# Patient Record
Sex: FEMALE | Race: WHITE
[De-identification: names, ages, dates, MRNs, and addresses within clinical notes are randomized per-mention and may not be internally consistent; named-entity substitution may affect disease eponyms.]

---

## 2017-01-17 ENCOUNTER — RX ONLY (OUTPATIENT)
Age: 22
Setting detail: RX ONLY
End: 2017-01-17

## 2017-01-23 ENCOUNTER — RX ONLY (OUTPATIENT)
Age: 22
Setting detail: RX ONLY
End: 2017-01-23

## 2018-08-27 ENCOUNTER — APPOINTMENT (RX ONLY)
Dept: URBAN - METROPOLITAN AREA OTHER 8 | Facility: OTHER | Age: 23
Setting detail: DERMATOLOGY
End: 2018-08-27

## 2018-08-27 DIAGNOSIS — T07XXXA INSECT BITE, NONVENOMOUS, OF OTHER, MULTIPLE, AND UNSPECIFIED SITES, WITHOUT MENTION OF INFECTION: ICD-10-CM

## 2018-08-27 DIAGNOSIS — L85.3 XEROSIS CUTIS: ICD-10-CM

## 2018-08-27 DIAGNOSIS — L72.8 OTHER FOLLICULAR CYSTS OF THE SKIN AND SUBCUTANEOUS TISSUE: ICD-10-CM

## 2018-08-27 PROBLEM — S80.861A INSECT BITE (NONVENOMOUS), RIGHT LOWER LEG, INITIAL ENCOUNTER: Status: ACTIVE | Noted: 2018-08-27

## 2018-08-27 PROBLEM — E03.9 HYPOTHYROIDISM, UNSPECIFIED: Status: ACTIVE | Noted: 2018-08-27

## 2018-08-27 PROBLEM — S80.862A INSECT BITE (NONVENOMOUS), LEFT LOWER LEG, INITIAL ENCOUNTER: Status: ACTIVE | Noted: 2018-08-27

## 2018-08-27 PROBLEM — L20.84 INTRINSIC (ALLERGIC) ECZEMA: Status: ACTIVE | Noted: 2018-08-27

## 2018-08-27 PROCEDURE — ? COUNSELING

## 2018-08-27 PROCEDURE — 99213 OFFICE O/P EST LOW 20 MIN: CPT

## 2018-08-27 ASSESSMENT — LOCATION SIMPLE DESCRIPTION DERM
LOCATION SIMPLE: POSTERIOR NECK
LOCATION SIMPLE: LEFT FOREHEAD
LOCATION SIMPLE: LEFT FOREARM
LOCATION SIMPLE: RIGHT PRETIBIAL REGION
LOCATION SIMPLE: LEFT FOREHEAD
LOCATION SIMPLE: RIGHT PRETIBIAL REGION
LOCATION SIMPLE: LEFT PRETIBIAL REGION

## 2018-08-27 ASSESSMENT — LOCATION DETAILED DESCRIPTION DERM
LOCATION DETAILED: RIGHT PROXIMAL PRETIBIAL REGION
LOCATION DETAILED: LEFT VENTRAL LATERAL PROXIMAL FOREARM
LOCATION DETAILED: RIGHT INFERIOR POSTERIOR NECK
LOCATION DETAILED: LEFT INFERIOR MEDIAL FOREHEAD
LOCATION DETAILED: LEFT LATERAL PROXIMAL PRETIBIAL REGION
LOCATION DETAILED: RIGHT PROXIMAL PRETIBIAL REGION
LOCATION DETAILED: LEFT INFERIOR MEDIAL FOREHEAD

## 2018-08-27 ASSESSMENT — LOCATION ZONE DERM
LOCATION ZONE: LEG
LOCATION ZONE: FACE
LOCATION ZONE: FACE
LOCATION ZONE: ARM
LOCATION ZONE: NECK
LOCATION ZONE: LEG

## 2018-11-30 ENCOUNTER — APPOINTMENT (RX ONLY)
Dept: URBAN - METROPOLITAN AREA OTHER 8 | Facility: OTHER | Age: 23
Setting detail: DERMATOLOGY
End: 2018-11-30

## 2018-11-30 ENCOUNTER — APPOINTMENT (RX ONLY)
Dept: URBAN - METROPOLITAN AREA OTHER 9 | Facility: OTHER | Age: 23
Setting detail: DERMATOLOGY
End: 2018-11-30

## 2018-11-30 DIAGNOSIS — L30.0 NUMMULAR DERMATITIS: ICD-10-CM

## 2018-11-30 DIAGNOSIS — B07.8 OTHER VIRAL WARTS: ICD-10-CM

## 2018-11-30 PROBLEM — D23.5 OTHER BENIGN NEOPLASM OF SKIN OF TRUNK: Status: ACTIVE | Noted: 2018-11-30

## 2018-11-30 PROBLEM — D23.61 OTHER BENIGN NEOPLASM OF SKIN OF RIGHT UPPER LIMB, INCLUDING SHOULDER: Status: ACTIVE | Noted: 2018-11-30

## 2018-11-30 PROCEDURE — 11900 INJECT SKIN LESIONS </W 7: CPT

## 2018-11-30 PROCEDURE — ? PRESCRIPTION

## 2018-11-30 PROCEDURE — ? INTRALESIONAL KENALOG

## 2018-11-30 PROCEDURE — 99213 OFFICE O/P EST LOW 20 MIN: CPT | Mod: 25

## 2018-11-30 PROCEDURE — ? COUNSELING

## 2018-11-30 RX ORDER — TRIAMCINOLONE ACETONIDE 1 MG/G
CREAM TOPICAL BID
Qty: 1 | Refills: 0 | Status: ERX | COMMUNITY
Start: 2018-11-30

## 2018-11-30 RX ORDER — SALICYLIC ACID 285 MG/ML
SOLUTION TOPICAL
Qty: 1 | Refills: 1 | Status: ERX | COMMUNITY
Start: 2018-11-30

## 2018-11-30 RX ORDER — CLINDAMYCIN PHOSPHATE 10 MG/ML
LOTION TOPICAL
Qty: 1 | Refills: 0 | Status: ERX | COMMUNITY
Start: 2018-11-30

## 2018-11-30 RX ADMIN — CLINDAMYCIN PHOSPHATE: 10 LOTION TOPICAL at 16:39

## 2018-11-30 RX ADMIN — TRIAMCINOLONE ACETONIDE: 1 CREAM TOPICAL at 16:50

## 2018-11-30 RX ADMIN — SALICYLIC ACID: 285 SOLUTION TOPICAL at 16:45

## 2018-11-30 NOTE — PROCEDURE: COUNSELING
Detail Level: Simple
Patient Specific Counseling (Will Not Stick From Patient To Patient): Pt will apply TMC bid when flaring. Gave pt sample of impoyz. She can take Zyrtec for itching
Patient Specific Counseling (Will Not Stick From Patient To Patient): Pt has been using otc and seems to be working. Sending in rx and she will do soaks and recheck 6 weeks

## 2018-11-30 NOTE — HPI: RASH
How Severe Is Your Rash?: moderate
Is This A New Presentation, Or A Follow-Up?: Rash
Additional History: Pt has applied otc wart removal, possible corn

## 2018-11-30 NOTE — PROCEDURE: INTRALESIONAL KENALOG
Medical Necessity Clause: This procedure was medically necessary because the lesions that were treated were:
Include Z78.9 (Other Specified Conditions Influencing Health Status) As An Associated Diagnosis?: No
Ndc# For Kenalog Only: 7963-3900-44
Concentration Of Kenalog Solution Injected (Mg/Ml): 5.0
Consent: The risks of atrophy were reviewed with the patient.
Administered By (Optional): Devorah
Total Volume (Ccs): .3
X Size Of Lesion In Cm (Optional): 0
Kenalog Preparation: Kenalog
Size Of Lesion (Optional): 0.4
Detail Level: Detailed

## 2018-12-03 ENCOUNTER — APPOINTMENT (RX ONLY)
Dept: URBAN - METROPOLITAN AREA OTHER 9 | Facility: OTHER | Age: 23
Setting detail: DERMATOLOGY
End: 2018-12-03

## 2018-12-14 ENCOUNTER — APPOINTMENT (RX ONLY)
Dept: URBAN - METROPOLITAN AREA OTHER 8 | Facility: OTHER | Age: 23
Setting detail: DERMATOLOGY
End: 2018-12-14

## 2018-12-14 DIAGNOSIS — L29.8 OTHER PRURITUS: ICD-10-CM

## 2018-12-14 DIAGNOSIS — L29.89 OTHER PRURITUS: ICD-10-CM

## 2018-12-14 DIAGNOSIS — D22 MELANOCYTIC NEVI: ICD-10-CM

## 2018-12-14 PROBLEM — D22.39 MELANOCYTIC NEVI OF OTHER PARTS OF FACE: Status: ACTIVE | Noted: 2018-12-14

## 2018-12-14 PROCEDURE — 99213 OFFICE O/P EST LOW 20 MIN: CPT

## 2018-12-14 PROCEDURE — ? OBSERVATION AND MEASURE

## 2018-12-14 PROCEDURE — ? COUNSELING

## 2018-12-14 ASSESSMENT — LOCATION DETAILED DESCRIPTION DERM
LOCATION DETAILED: LEFT INFERIOR CENTRAL MALAR CHEEK
LOCATION DETAILED: LEFT INFERIOR LATERAL MALAR CHEEK

## 2018-12-14 ASSESSMENT — LOCATION ZONE DERM: LOCATION ZONE: FACE

## 2018-12-14 ASSESSMENT — LOCATION SIMPLE DESCRIPTION DERM: LOCATION SIMPLE: LEFT CHEEK

## 2018-12-14 NOTE — PROCEDURE: OBSERVATION
Morphology Per Location (Optional): Light brown thin papule
Detail Level: Simple
Size Of Lesion: 0.3 cm

## 2018-12-14 NOTE — HPI: SKIN LESION
What Type Of Note Output Would You Prefer (Optional)?: Standard Output
How Severe Is Your Skin Lesion?: mild
Has Your Skin Lesion Been Treated?: not been treated
Is This A New Presentation, Or A Follow-Up?: Skin Lesion
Which Family Member (Optional)?: Grandfather (maternal)

## 2020-12-17 ENCOUNTER — OFFICE VISIT (OUTPATIENT)
Dept: URBAN - METROPOLITAN AREA CLINIC 78 | Facility: CLINIC | Age: 25
End: 2020-12-17
Payer: COMMERCIAL

## 2020-12-17 VITALS
BODY MASS INDEX: 20.93 KG/M2 | SYSTOLIC BLOOD PRESSURE: 117 MMHG | HEART RATE: 69 BPM | RESPIRATION RATE: 16 BRPM | WEIGHT: 122.6 LBS | TEMPERATURE: 98.1 F | HEIGHT: 64 IN | DIASTOLIC BLOOD PRESSURE: 72 MMHG

## 2020-12-17 DIAGNOSIS — K58.9 IBS (IRRITABLE BOWEL SYNDROME) DIARRHEA PREDOMINANT: ICD-10-CM

## 2020-12-17 DIAGNOSIS — R19.8 BORBORYGMUS: ICD-10-CM

## 2020-12-17 DIAGNOSIS — K20.0 ESOPHAGITIS, EOSINOPHILIC: ICD-10-CM

## 2020-12-17 DIAGNOSIS — R19.4 CHANGE IN BOWEL HABITS: ICD-10-CM

## 2020-12-17 PROCEDURE — 99243 OFF/OP CNSLTJ NEW/EST LOW 30: CPT | Performed by: INTERNAL MEDICINE

## 2020-12-17 RX ORDER — ESCITALOPRAM OXALATE 10 MG/1
1 TABLET TABLET, FILM COATED ORAL ONCE A DAY
Status: ACTIVE | COMMUNITY

## 2020-12-17 NOTE — HPI-TODAY'S VISIT:
Patient was referred by Dr. Yan Dudley for GI symptoms. A copy of this document will be sent to the physician.  Patient states that she was in [place] for a year as part of [employer] [place] She was dx with Eosinophillic esophagitis in [place] via endoscopy  Acid reducers were presribed Has been taking Nexium as needed since  She wanted that evaluated in [place]  Other complains  IBS type symptoms  Diarrhea and gurgling of stomach  and discomfort  Multiple loose stools a day  Abdominal cramping has gotten better  and diarrhea has gotten better  Late March PCP did stool studies : neg  reportedly for ova and parasites  Denies rectal bleeding  Dark stools in past  not current  She eats a plan based diet  Baseline BM reportedly pretty normal   Took abx 6 months ago none in last 6 months    Aunts ..colostomy  for ? colon cancer   ? Colon cancer  history

## 2020-12-24 ENCOUNTER — OFFICE VISIT (OUTPATIENT)
Dept: URBAN - METROPOLITAN AREA CLINIC 78 | Facility: CLINIC | Age: 25
End: 2020-12-24

## 2022-04-01 ENCOUNTER — OFFICE VISIT (OUTPATIENT)
Dept: URBAN - METROPOLITAN AREA CLINIC 35 | Facility: CLINIC | Age: 27
End: 2022-04-01
Payer: COMMERCIAL

## 2022-04-01 ENCOUNTER — WEB ENCOUNTER (OUTPATIENT)
Dept: URBAN - METROPOLITAN AREA CLINIC 35 | Facility: CLINIC | Age: 27
End: 2022-04-01

## 2022-04-01 ENCOUNTER — LAB OUTSIDE AN ENCOUNTER (OUTPATIENT)
Dept: URBAN - METROPOLITAN AREA CLINIC 35 | Facility: CLINIC | Age: 27
End: 2022-04-01

## 2022-04-01 VITALS
DIASTOLIC BLOOD PRESSURE: 76 MMHG | WEIGHT: 122 LBS | SYSTOLIC BLOOD PRESSURE: 118 MMHG | BODY MASS INDEX: 20.83 KG/M2 | HEIGHT: 64 IN

## 2022-04-01 DIAGNOSIS — R12 HEART BURN: ICD-10-CM

## 2022-04-01 DIAGNOSIS — K20.0 EOSINOPHILIC ESOPHAGITIS: ICD-10-CM

## 2022-04-01 PROCEDURE — 99204 OFFICE O/P NEW MOD 45 MIN: CPT | Performed by: INTERNAL MEDICINE

## 2022-04-01 RX ORDER — LEVONORGESTREL AND ETHINYL ESTRADIOL 0.1-0.02MG
1 TABLET KIT ORAL ONCE A DAY
Status: ACTIVE | COMMUNITY

## 2022-04-01 RX ORDER — OMEPRAZOLE 40 MG/1
1 CAPSULE 30 MINUTES BEFORE MORNING MEAL CAPSULE, DELAYED RELEASE ORAL ONCE A DAY
Qty: 30 | Refills: 5 | OUTPATIENT
Start: 2022-04-01

## 2022-04-01 NOTE — HPI-HEARTBURN
27 year olf female patient presents for heartburn consult. Patient states he has been experiencing symptoms for a couple of years  but recently within the past 2 months she started having more symptoms . Patient admits currently taking TUMS OTC  for relief of symptoms. Patient describes symptoms as burning sensation in mid chest and states they are most present during or after all meals .  Patient admits being diagnosed in 2019 with Eosinophilic Esophagitis via EGD biopsy completed in [place] . Patient denies nighttime symptoms.   Patient also states intermittently she will have dysphagia  when she is eating . Patient denies nausea or vomiting. Patient denies any associated weight loss. Patient admits EGD in the past.

## 2022-04-20 PROBLEM — 235599003 EOSINOPHILIC ESOPHAGITIS: Status: ACTIVE | Noted: 2020-12-17

## 2022-05-11 ENCOUNTER — WEB ENCOUNTER (OUTPATIENT)
Dept: URBAN - METROPOLITAN AREA SURGERY CENTER 8 | Facility: SURGERY CENTER | Age: 27
End: 2022-05-11

## 2022-05-12 ENCOUNTER — OFFICE VISIT (OUTPATIENT)
Dept: URBAN - METROPOLITAN AREA SURGERY CENTER 8 | Facility: SURGERY CENTER | Age: 27
End: 2022-05-12
Payer: COMMERCIAL

## 2022-05-12 DIAGNOSIS — K20.0 EOSINOPHILIC ESOPHAGITIS: ICD-10-CM

## 2022-05-12 DIAGNOSIS — R12 BURNING REFLUX: ICD-10-CM

## 2022-05-12 PROCEDURE — 43239 EGD BIOPSY SINGLE/MULTIPLE: CPT | Performed by: INTERNAL MEDICINE

## 2022-05-12 PROCEDURE — G8907 PT DOC NO EVENTS ON DISCHARG: HCPCS | Performed by: INTERNAL MEDICINE

## 2022-05-26 ENCOUNTER — OFFICE VISIT (OUTPATIENT)
Dept: URBAN - METROPOLITAN AREA CLINIC 35 | Facility: CLINIC | Age: 27
End: 2022-05-26

## 2022-06-06 ENCOUNTER — CLAIMS CREATED FROM THE CLAIM WINDOW (OUTPATIENT)
Dept: URBAN - METROPOLITAN AREA CLINIC 33 | Facility: CLINIC | Age: 27
End: 2022-06-06
Payer: COMMERCIAL

## 2022-06-06 VITALS
BODY MASS INDEX: 20.49 KG/M2 | HEART RATE: 76 BPM | DIASTOLIC BLOOD PRESSURE: 68 MMHG | WEIGHT: 120 LBS | SYSTOLIC BLOOD PRESSURE: 106 MMHG | OXYGEN SATURATION: 100 % | HEIGHT: 64 IN

## 2022-06-06 DIAGNOSIS — R12 HEART BURN: ICD-10-CM

## 2022-06-06 DIAGNOSIS — K20.0 ESOPHAGITIS, EOSINOPHILIC: ICD-10-CM

## 2022-06-06 PROCEDURE — 99214 OFFICE O/P EST MOD 30 MIN: CPT | Performed by: INTERNAL MEDICINE

## 2022-06-06 RX ORDER — OMEPRAZOLE 40 MG/1
1 CAPSULE 30 MINUTES BEFORE MORNING MEAL CAPSULE, DELAYED RELEASE ORAL ONCE A DAY
Qty: 30 | Refills: 5 | Status: ACTIVE | COMMUNITY
Start: 2022-04-01

## 2022-06-06 RX ORDER — LEVONORGESTREL AND ETHINYL ESTRADIOL 0.1-0.02MG
1 TABLET KIT ORAL ONCE A DAY
Status: ACTIVE | COMMUNITY

## 2022-06-06 RX ORDER — OMEPRAZOLE 40 MG/1
1 CAPSULE 30 MINUTES BEFORE MORNING MEAL CAPSULE, DELAYED RELEASE ORAL
Qty: 120 | Refills: 1 | OUTPATIENT
Start: 2022-06-06

## 2022-06-06 NOTE — HPI-HEARTBURN
Patient presents for folow up of heartburn and EGD . Pt had ED completed since last visit and denies any complications . She  admits continuing Omeprazole 40mg daily with great relief . SHe denies nausea or vomiting . She denies any abdominl pain or burning sensations .     of last visit (04/01/2022) 27 year olf female patient presents for heartburn consult. Patient states he has been experiencing symptoms for a couple of years  but recently within the past 2 months she started having more symptoms . Patient admits currently taking TUMS OTC  for relief of symptoms. Patient describes symptoms as burning sensation in mid chest and states they are most present during or after all meals .  Patient admits being diagnosed in 2019 with Eosinophilic Esophagitis via EGD biopsy completed in [place] . Patient denies nighttime symptoms.   Patient also states intermittently she will have dysphagia  when she is eating . Patient denies nausea or vomiting. Patient denies any associated weight loss. Patient admits EGD in the past.

## 2022-08-01 ENCOUNTER — OFFICE VISIT (OUTPATIENT)
Dept: URBAN - METROPOLITAN AREA CLINIC 33 | Facility: CLINIC | Age: 27
End: 2022-08-01

## 2022-08-01 NOTE — HPI-HEARTBURN
Patient presents for follow up of heartburn . He admits /denies continuing Omeprazole 40mg. Patient admits /denies breakthrough     Of last visit (06/06/2022) Patient presents for folow up of heartburn and EGD . Pt had ED completed since last visit and denies any complications . She  admits continuing Omeprazole 40mg daily with great relief . SHe denies nausea or vomiting . She denies any abdominl pain or burning sensations .     of last visit (04/01/2022) 27 year olf female patient presents for heartburn consult. Patient states he has been experiencing symptoms for a couple of years  but recently within the past 2 months she started having more symptoms . Patient admits currently taking TUMS OTC  for relief of symptoms. Patient describes symptoms as burning sensation in mid chest and states they are most present during or after all meals .  Patient admits being diagnosed in 2019 with Eosinophilic Esophagitis via EGD biopsy completed in [place] . Patient denies nighttime symptoms.   Patient also states intermittently she will have dysphagia  when she is eating . Patient denies nausea or vomiting. Patient denies any associated weight loss. Patient admits EGD in the past.

## 2022-08-08 ENCOUNTER — OFFICE VISIT (OUTPATIENT)
Dept: URBAN - METROPOLITAN AREA CLINIC 33 | Facility: CLINIC | Age: 27
End: 2022-08-08

## 2022-08-19 ENCOUNTER — DASHBOARD ENCOUNTERS (OUTPATIENT)
Age: 27
End: 2022-08-19

## 2022-08-19 ENCOUNTER — OFFICE VISIT (OUTPATIENT)
Dept: URBAN - METROPOLITAN AREA CLINIC 35 | Facility: CLINIC | Age: 27
End: 2022-08-19
Payer: COMMERCIAL

## 2022-08-19 VITALS
WEIGHT: 128 LBS | OXYGEN SATURATION: 98 % | DIASTOLIC BLOOD PRESSURE: 82 MMHG | BODY MASS INDEX: 21.85 KG/M2 | HEIGHT: 64 IN | SYSTOLIC BLOOD PRESSURE: 124 MMHG | HEART RATE: 88 BPM

## 2022-08-19 DIAGNOSIS — R19.7 DIARRHEA OF PRESUMED INFECTIOUS ORIGIN: ICD-10-CM

## 2022-08-19 DIAGNOSIS — K20.0 EOSINOPHILIC ESOPHAGITIS: ICD-10-CM

## 2022-08-19 DIAGNOSIS — R12 HEART BURN: ICD-10-CM

## 2022-08-19 DIAGNOSIS — K58.9 IBS (IRRITABLE BOWEL SYNDROME) DIARRHEA PREDOMINANT: ICD-10-CM

## 2022-08-19 PROBLEM — 235599003: Status: ACTIVE | Noted: 2022-04-01

## 2022-08-19 PROBLEM — 10743008 IRRITABLE BOWEL SYNDROME: Status: ACTIVE | Noted: 2020-12-17

## 2022-08-19 PROBLEM — 16331000: Status: ACTIVE | Noted: 2022-04-01

## 2022-08-19 PROCEDURE — 99214 OFFICE O/P EST MOD 30 MIN: CPT | Performed by: INTERNAL MEDICINE

## 2022-08-19 RX ORDER — LEVONORGESTREL AND ETHINYL ESTRADIOL 0.1-0.02MG
1 TABLET KIT ORAL ONCE A DAY
Status: ACTIVE | COMMUNITY

## 2022-08-19 RX ORDER — FLUTICASONE PROPIONATE 50 UG/1
1 SPRAY IN EACH NOSTRIL SPRAY, METERED NASAL ONCE A DAY
Status: ACTIVE | COMMUNITY

## 2022-08-19 RX ORDER — OMEPRAZOLE 40 MG/1
1 CAPSULE 30 MINUTES BEFORE MORNING MEAL CAPSULE, DELAYED RELEASE ORAL ONCE A DAY
Qty: 30 | Refills: 5 | Status: ACTIVE | COMMUNITY
Start: 2022-04-01

## 2022-08-19 NOTE — HPI-HEARTBURN
Patient presents for follow up of heartburn. She stated she has not been having any complications with heart burn since her procedure. She admits continuing Omeprazole 40 mg for relief. She admits to having improvements.     Of last visit (06/06/2022) Patient presents for follow up of heartburn and EGD. Pt had ED completed since last visit and denies any complications. She  admits continuing Omeprazole 40 mg daily with great relief. SHe denies nausea or vomiting. She denies any abdominal pain or burning sensations.     Of last visit (04/01/2022) 27 year old female patient presents for heartburn consult. Patient states he has been experiencing symptoms for a couple of years  but recently within the past 2 months she started having more symptoms. Patient admits currently taking TUMS OTC  for relief of symptoms. Patient describes symptoms as burning sensation in mid chest and states they are most present during or after all meals.  Patient admits being diagnosed in 2019 with Eosinophilic Esophagitis via EGD biopsy completed in [place]. Patient denies nighttime symptoms.   Patient also states intermittently she will have dysphagia  when she is eating. Patient denies nausea or vomiting. Patient denies any associated weight loss. Patient admits EGD in the past.

## 2022-08-19 NOTE — HPI-DIARRHEA
Patient has been experiencing symptoms of diarrhea since the first week of  August since she left [place]. She admits to having some changes in her bowel habits. She stated she has been having loose watery stools. She admits to dark stools, She denies having blood or mucus in her stools. Patient admits to 2 -3 bowel movements per day without strain. She denies Vomiting and abdominal discomfort or pain.

## 2022-09-14 ENCOUNTER — TELEPHONE ENCOUNTER (OUTPATIENT)
Dept: URBAN - METROPOLITAN AREA CLINIC 23 | Facility: CLINIC | Age: 27
End: 2022-09-14

## 2022-09-16 ENCOUNTER — OFFICE VISIT (OUTPATIENT)
Dept: URBAN - METROPOLITAN AREA CLINIC 35 | Facility: CLINIC | Age: 27
End: 2022-09-16

## 2022-09-16 NOTE — HPI-HEARTBURN
Patient presents today for follow up of heartburn. She admits/denies continued use of Omeprazole 40 mg for relief. She admits/ denies making diet changes.   Of last visit (8/19/2022): Patient presents for follow up of heartburn. She stated she has not been having any complications with heart burn since her procedure. She admits continuing Omeprazole 40 mg for relief. She admits to having improvements.     Of last visit (06/06/2022) Patient presents for follow up of heartburn and EGD. Pt had ED completed since last visit and denies any complications. She  admits continuing Omeprazole 40 mg daily with great relief. SHe denies nausea or vomiting. She denies any abdominal pain or burning sensations.

## 2022-09-16 NOTE — HPI-DIARRHEA
Patient currently admits __bowel movements a day with __stools.  She admits /denies improvement of symtpomssince last visit .  Admits /denies taking recent antibiotics. Patient admits completing  stools study since last visit .  . Patient admits/denies blood in stools, melena , or mucus .  Of last visit (8/19/2022): Patient has been experiencing symptoms of diarrhea since the first week of  August since she left [place]. She admits to having some changes in her bowel habits. She stated she has been having loose watery stools. She admits to dark stools, She denies having blood or mucus in her stools. Patient admits to 2 -3 bowel movements per day without strain. She denies Vomiting and abdominal discomfort or pain.

## 2022-09-28 ENCOUNTER — TELEPHONE ENCOUNTER (OUTPATIENT)
Dept: URBAN - METROPOLITAN AREA CLINIC 36 | Facility: CLINIC | Age: 27
End: 2022-09-28

## 2022-09-28 RX ORDER — OMEPRAZOLE 40 MG/1
1 CAPSULE 30 MINUTES BEFORE MORNING MEAL CAPSULE, DELAYED RELEASE ORAL ONCE A DAY
Qty: 90 | Refills: 0
Start: 2022-04-01

## 2023-08-11 ENCOUNTER — OFFICE VISIT (OUTPATIENT)
Dept: URBAN - METROPOLITAN AREA CLINIC 35 | Facility: CLINIC | Age: 28
End: 2023-08-11

## 2024-08-09 ENCOUNTER — OFFICE VISIT (OUTPATIENT)
Dept: URBAN - METROPOLITAN AREA CLINIC 35 | Facility: CLINIC | Age: 29
End: 2024-08-09

## 2024-08-13 ENCOUNTER — OFFICE VISIT (OUTPATIENT)
Dept: URBAN - METROPOLITAN AREA CLINIC 35 | Facility: CLINIC | Age: 29
End: 2024-08-13
Payer: COMMERCIAL

## 2024-08-13 ENCOUNTER — LAB OUTSIDE AN ENCOUNTER (OUTPATIENT)
Dept: URBAN - METROPOLITAN AREA CLINIC 35 | Facility: CLINIC | Age: 29
End: 2024-08-13

## 2024-08-13 VITALS
SYSTOLIC BLOOD PRESSURE: 112 MMHG | BODY MASS INDEX: 22.2 KG/M2 | HEIGHT: 64 IN | HEART RATE: 85 BPM | DIASTOLIC BLOOD PRESSURE: 70 MMHG | OXYGEN SATURATION: 97 % | WEIGHT: 130 LBS

## 2024-08-13 DIAGNOSIS — Z83.719 FAMILY HX COLONIC POLYPS: ICD-10-CM

## 2024-08-13 DIAGNOSIS — K20.0 EOSINOPHILIC ESOPHAGITIS: ICD-10-CM

## 2024-08-13 PROCEDURE — 99214 OFFICE O/P EST MOD 30 MIN: CPT | Performed by: INTERNAL MEDICINE

## 2024-08-13 RX ORDER — SODIUM, POTASSIUM,MAG SULFATES 17.5-3.13G
AS DIRECTED SOLUTION, RECONSTITUTED, ORAL ORAL
OUTPATIENT
Start: 2024-08-13

## 2024-08-13 RX ORDER — LEVONORGESTREL AND ETHINYL ESTRADIOL 0.1-0.02MG
1 TABLET KIT ORAL ONCE A DAY
Status: ACTIVE | COMMUNITY

## 2024-08-13 RX ORDER — FLUTICASONE PROPIONATE 50 UG/1
1 SPRAY IN EACH NOSTRIL SPRAY, METERED NASAL ONCE A DAY
Status: ACTIVE | COMMUNITY

## 2024-08-13 RX ORDER — OMEPRAZOLE 20 MG/1
1 CAPSULE 30 MINUTES BEFORE MORNING MEAL CAPSULE, DELAYED RELEASE ORAL ONCE A DAY
Qty: 90 | Refills: 3 | OUTPATIENT
Start: 2024-08-13

## 2024-08-13 RX ORDER — OMEPRAZOLE 40 MG/1
1 CAPSULE 30 MINUTES BEFORE MORNING MEAL CAPSULE, DELAYED RELEASE ORAL ONCE A DAY
Qty: 90 | Refills: 0 | Status: ON HOLD | COMMUNITY
Start: 2022-04-01

## 2024-08-13 NOTE — HPI-HEARTBURN
29 years old female patient presents for heartburn consult. Patient states he has been experiencing symptoms since 12/2023. Patient admits currently taking  OTC (Rolaids) for relief of symptoms. Patient describes symptoms as burning and painful to swallow. and states they are most present during or after meals. Patient admits nighttime symptoms. Patient denies nausea or vomiting. Patient denies any associated weight loss. Patient admits EGD in the past.

## 2024-08-13 NOTE — HPI-TODAY'S VISIT:
Patient is a still having a change in bowel habits off and on.  Patient has a family history of colon polyp and colon cancer in mother and multiple first-degree relatives in a younger age.  Denies any history of bleeding per rectum.

## 2024-09-18 ENCOUNTER — TELEPHONE ENCOUNTER (OUTPATIENT)
Dept: URBAN - METROPOLITAN AREA CLINIC 35 | Facility: CLINIC | Age: 29
End: 2024-09-18

## 2024-09-19 ENCOUNTER — CLAIMS CREATED FROM THE CLAIM WINDOW (OUTPATIENT)
Dept: URBAN - METROPOLITAN AREA SURGERY CENTER 8 | Facility: SURGERY CENTER | Age: 29
End: 2024-09-19
Payer: COMMERCIAL

## 2024-09-19 ENCOUNTER — CLAIMS CREATED FROM THE CLAIM WINDOW (OUTPATIENT)
Dept: URBAN - METROPOLITAN AREA CLINIC 4 | Facility: CLINIC | Age: 29
End: 2024-09-19
Payer: COMMERCIAL

## 2024-09-19 DIAGNOSIS — R12 HEARTBURN: ICD-10-CM

## 2024-09-19 DIAGNOSIS — K21.9 ACID REFLUX: ICD-10-CM

## 2024-09-19 DIAGNOSIS — K44.9 HIATAL HERNIA: ICD-10-CM

## 2024-09-19 DIAGNOSIS — K21.9 GASTRO - ESOPHAGEAL REFLUX DISEASE: ICD-10-CM

## 2024-09-19 DIAGNOSIS — Z83.719 FAMILY HISTORY OF COLONIC POLYPS: ICD-10-CM

## 2024-09-19 DIAGNOSIS — K21.9 GASTRO-ESOPHAGEAL REFLUX DISEASE WITHOUT ESOPHAGITIS: ICD-10-CM

## 2024-09-19 DIAGNOSIS — Z83.719 FAMILY HISTORY OF COLON POLYPS, UNSPECIFIED: ICD-10-CM

## 2024-09-19 PROCEDURE — 45378 DIAGNOSTIC COLONOSCOPY: CPT | Performed by: INTERNAL MEDICINE

## 2024-09-19 PROCEDURE — 88305 TISSUE EXAM BY PATHOLOGIST: CPT | Performed by: PATHOLOGY

## 2024-09-19 PROCEDURE — 00813 ANES UPR LWR GI NDSC PX: CPT | Performed by: NURSE ANESTHETIST, CERTIFIED REGISTERED

## 2024-09-19 PROCEDURE — 88312 SPECIAL STAINS GROUP 1: CPT | Performed by: PATHOLOGY

## 2024-09-19 PROCEDURE — 43239 EGD BIOPSY SINGLE/MULTIPLE: CPT | Performed by: INTERNAL MEDICINE

## 2024-09-19 RX ORDER — OMEPRAZOLE 40 MG/1
1 CAPSULE 30 MINUTES BEFORE MORNING MEAL CAPSULE, DELAYED RELEASE ORAL ONCE A DAY
Qty: 90 | Refills: 0 | Status: ON HOLD | COMMUNITY
Start: 2022-04-01

## 2024-09-19 RX ORDER — SODIUM, POTASSIUM,MAG SULFATES 17.5-3.13G
AS DIRECTED SOLUTION, RECONSTITUTED, ORAL ORAL
Status: ACTIVE | COMMUNITY
Start: 2024-08-13

## 2024-09-19 RX ORDER — LEVONORGESTREL AND ETHINYL ESTRADIOL 0.1-0.02MG
1 TABLET KIT ORAL ONCE A DAY
Status: ACTIVE | COMMUNITY

## 2024-09-19 RX ORDER — FLUTICASONE PROPIONATE 50 UG/1
1 SPRAY IN EACH NOSTRIL SPRAY, METERED NASAL ONCE A DAY
Status: ACTIVE | COMMUNITY

## 2024-09-19 RX ORDER — OMEPRAZOLE 20 MG/1
1 CAPSULE 30 MINUTES BEFORE MORNING MEAL CAPSULE, DELAYED RELEASE ORAL ONCE A DAY
Qty: 90 | Refills: 3 | Status: ACTIVE | COMMUNITY
Start: 2024-08-13

## 2024-10-08 ENCOUNTER — OFFICE VISIT (OUTPATIENT)
Dept: URBAN - METROPOLITAN AREA CLINIC 35 | Facility: CLINIC | Age: 29
End: 2024-10-08

## 2024-10-08 ENCOUNTER — OFFICE VISIT (OUTPATIENT)
Dept: URBAN - METROPOLITAN AREA CLINIC 35 | Facility: CLINIC | Age: 29
End: 2024-10-08
Payer: COMMERCIAL

## 2024-10-08 VITALS
DIASTOLIC BLOOD PRESSURE: 82 MMHG | HEART RATE: 84 BPM | SYSTOLIC BLOOD PRESSURE: 126 MMHG | HEIGHT: 64 IN | BODY MASS INDEX: 22.36 KG/M2 | WEIGHT: 131 LBS | OXYGEN SATURATION: 97 %

## 2024-10-08 DIAGNOSIS — K20.90 ACUTE ESOPHAGITIS: ICD-10-CM

## 2024-10-08 DIAGNOSIS — K58.9 IBS (IRRITABLE BOWEL SYNDROME) DIARRHEA PREDOMINANT: ICD-10-CM

## 2024-10-08 PROCEDURE — 99213 OFFICE O/P EST LOW 20 MIN: CPT | Performed by: INTERNAL MEDICINE

## 2024-10-08 RX ORDER — OMEPRAZOLE 40 MG/1
1 CAPSULE 30 MINUTES BEFORE MORNING MEAL CAPSULE, DELAYED RELEASE ORAL ONCE A DAY
Qty: 90 | Refills: 0 | Status: ON HOLD | COMMUNITY
Start: 2022-04-01

## 2024-10-08 RX ORDER — FLUTICASONE PROPIONATE 50 UG/1
1 SPRAY IN EACH NOSTRIL SPRAY, METERED NASAL ONCE A DAY
Status: ACTIVE | COMMUNITY

## 2024-10-08 RX ORDER — LEVONORGESTREL AND ETHINYL ESTRADIOL 0.1-0.02MG
1 TABLET KIT ORAL ONCE A DAY
Status: ACTIVE | COMMUNITY

## 2024-10-08 RX ORDER — OMEPRAZOLE 20 MG/1
1 CAPSULE 30 MINUTES BEFORE MORNING MEAL CAPSULE, DELAYED RELEASE ORAL ONCE A DAY
Qty: 90 | Refills: 3 | Status: ACTIVE | COMMUNITY
Start: 2024-08-13

## 2024-10-08 RX ORDER — SODIUM, POTASSIUM,MAG SULFATES 17.5-3.13G
AS DIRECTED SOLUTION, RECONSTITUTED, ORAL ORAL
Status: ON HOLD | COMMUNITY
Start: 2024-08-13

## 2024-10-08 NOTE — HPI-HEARTBURN
29 year old female patient presents for follow up. Admits/denies improvement. Admits/denies taking omeprazole 20mg for __ relief of symptoms. Patient describes symptoms as__and states they are most present during or after ___. Patient admits/denies nighttime symptoms. She had EGD completed on 09/19/2024 with results noted below. Admits/denies dysphagia, globus, changes in appetite, and changes in bowel habits. Admits/denies nausea or vomiting. Admits/denies any associated weight loss.   EGD REPORT  IMPRESSION: Esophageal mucosal changes consistent with eosinophilic esophagitis. Multiple biopsies were obtained in the middle third of the esophagus and in the lower third of the esophagus. Hiatal hernia. Normal stomach. Normal examined duodenum.  EGD PATH (A) Esophagus, Distal, Biopsy (Cold Forceps): SQUAMOUS MUCOSA WITH REFLUX-TYPE CHANGES; NO COLUMNAR MUCOSA IDENTIFIED. No Evidence of Rodriguez's Esophagus or Eosinophilic Esophagitis. Negative for Infectious organisms, Dysplasia or Malignancy. (B) Esophagus, Middle, Biopsy (Cold Forceps): SQUAMOUS MUCOSA WITH REFLUX-TYPE CHANGES; NO COLUMNAR MUCOSA IDENTIFIED. No Evidence of Rodriguez's Esophagus or Eosinophilic Esophagitis. Negative for Infectious organisms, Dysplasia or Malignancy.   Of last visit (08/13/2024) 29 years old female patient presents for heartburn consult. Patient states he has been experiencing symptoms since 12/2023. Patient admits currently taking  OTC (Rolaids) for relief of symptoms. Patient describes symptoms as burning and painful to swallow. and states they are most present during or after meals. Patient admits nighttime symptoms. Patient denies nausea or vomiting. Patient denies any associated weight loss. Patient admits EGD in the past.

## 2024-10-08 NOTE — HPI-CHANGE IN BOWEL HABITS
Patient presents for follow up. Admits/denies improvement. She admits symptoms began __. Admits __bowel movements a day with __stools. Had colonoscopy completed on 09/19/2024, with results noted below. She admits/denies any complications after procedure. Patient denies rectal bleeding, melena, or mucus.   COLON REPORT IMPRESSION: The entire examined colon is normal. No specimens collected.   Of last visit (08/13/2024) Patient is a still having a change in bowel habits off and on.  Patient has a family history of colon polyp and colon cancer in mother and multiple first-degree relatives in a younger age.  Denies any history of bleeding per rectum.

## 2024-10-08 NOTE — HPI-HEARTBURN
29 year old female patient presents for follow up. Admits improvement. Admits taking omeprazole 20mg with significant relief of symptoms. She describes symptoms as an intermittent dull aching cramp in the epigastric region, leaning more towards LUQ. Patient admits intermittent nighttime symptoms. She had EGD completed on 09/19/2024 with results noted below. Admits some dysphagia, globus. She states that it comes and goes and has been present before the procedure. Denies changes in appetite, and changes in bowel habits. Denies nausea or vomiting. Denies any associated weight loss.   EGD REPORT  IMPRESSION: Esophageal mucosal changes consistent with eosinophilic esophagitis. Multiple biopsies were obtained in the middle third of the esophagus and in the lower third of the esophagus. Hiatal hernia. Normal stomach. Normal examined duodenum.  EGD PATH (A) Esophagus, Distal, Biopsy (Cold Forceps): SQUAMOUS MUCOSA WITH REFLUX-TYPE CHANGES; NO COLUMNAR MUCOSA IDENTIFIED. No Evidence of Rodriguez's Esophagus or Eosinophilic Esophagitis. Negative for Infectious organisms, Dysplasia or Malignancy. (B) Esophagus, Middle, Biopsy (Cold Forceps): SQUAMOUS MUCOSA WITH REFLUX-TYPE CHANGES; NO COLUMNAR MUCOSA IDENTIFIED. No Evidence of Rodriguez's Esophagus or Eosinophilic Esophagitis. Negative for Infectious organisms, Dysplasia or Malignancy.   Of last visit (08/13/2024) 29 years old female patient presents for heartburn consult. Patient states he has been experiencing symptoms since 12/2023. Patient admits currently taking  OTC (Rolaids) for relief of symptoms. Patient describes symptoms as burning and painful to swallow. and states they are most present during or after meals. Patient admits nighttime symptoms. Patient denies nausea or vomiting. Patient denies any associated weight loss. Patient admits EGD in the past.

## 2024-10-08 NOTE — HPI-CHANGE IN BOWEL HABITS
Patient presents for follow up. Admits improvement. Admits 1-2 bowel movements a day with normal stools. Had colonoscopy completed on 09/19/2024, with results noted below. She denies any complications after procedure. Patient denies rectal bleeding, melena, or mucus.   COLON REPORT IMPRESSION: The entire examined colon is normal. No specimens collected.   Of last visit (08/13/2024) Patient is a still having a change in bowel habits off and on.  Patient has a family history of colon polyp and colon cancer in mother and multiple first-degree relatives in a younger age.  Denies any history of bleeding per rectum.

## 2024-10-09 ENCOUNTER — OFFICE VISIT (OUTPATIENT)
Dept: URBAN - METROPOLITAN AREA CLINIC 35 | Facility: CLINIC | Age: 29
End: 2024-10-09

## 2025-01-10 ENCOUNTER — OFFICE VISIT (OUTPATIENT)
Dept: URBAN - METROPOLITAN AREA CLINIC 35 | Facility: CLINIC | Age: 30
End: 2025-01-10

## 2025-01-10 NOTE — HPI-CHANGE IN BOWEL HABITS
Of last visit (10/08/2024) Patient presents for follow up. Admits improvement. Admits 1-2 bowel movements a day with normal stools. Had colonoscopy completed on 09/19/2024, with results noted below. She denies any complications after procedure. Patient denies rectal bleeding, melena, or mucus.   COLON REPORT IMPRESSION: The entire examined colon is normal. No specimens collected.   Of last visit (08/13/2024) Patient is a still having a change in bowel habits off and on.  Patient has a family history of colon polyp and colon cancer in mother and multiple first-degree relatives in a younger age.  Denies any history of bleeding per rectum.

## 2025-01-10 NOTE — HPI-HEARTBURN
29 year old female patient presents for follow up. She admits/denies improvement. Admits/denies taking omeprazole 20mg with ____ relief of symptoms. She admits/denies epigastric pain, dysphagia, nausea, or vomiting. Admits/denies any associated weight loss.   Of last visit (10/08/2024) 29 year old female patient presents for follow up. Admits improvement. Admits taking omeprazole 20mg with significant relief of symptoms. She describes symptoms as an intermittent dull aching cramp in the epigastric region, leaning more towards LUQ. Patient admits intermittent nighttime symptoms. She had EGD completed on 09/19/2024 with results noted below. Admits some dysphagia, globus. She states that it comes and goes and has been present before the procedure. Denies changes in appetite, and changes in bowel habits. Denies nausea or vomiting. Denies any associated weight loss.   EGD REPORT  IMPRESSION: Esophageal mucosal changes consistent with eosinophilic esophagitis. Multiple biopsies were obtained in the middle third of the esophagus and in the lower third of the esophagus. Hiatal hernia. Normal stomach. Normal examined duodenum.  EGD PATH (A) Esophagus, Distal, Biopsy (Cold Forceps): SQUAMOUS MUCOSA WITH REFLUX-TYPE CHANGES; NO COLUMNAR MUCOSA IDENTIFIED. No Evidence of Rodriguez's Esophagus or Eosinophilic Esophagitis. Negative for Infectious organisms, Dysplasia or Malignancy. (B) Esophagus, Middle, Biopsy (Cold Forceps): SQUAMOUS MUCOSA WITH REFLUX-TYPE CHANGES; NO COLUMNAR MUCOSA IDENTIFIED. No Evidence of Rodriguez's Esophagus or Eosinophilic Esophagitis. Negative for Infectious organisms, Dysplasia or Malignancy.   Of last visit (08/13/2024) 29 years old female patient presents for heartburn consult. Patient states he has been experiencing symptoms since 12/2023. Patient admits currently taking  OTC (Rolaids) for relief of symptoms. Patient describes symptoms as burning and painful to swallow. and states they are most present during or after meals. Patient admits nighttime symptoms. Patient denies nausea or vomiting. Patient denies any associated weight loss. Patient admits EGD in the past.

## 2025-08-29 ENCOUNTER — TELEPHONE ENCOUNTER (OUTPATIENT)
Dept: URBAN - METROPOLITAN AREA CLINIC 35 | Facility: CLINIC | Age: 30
End: 2025-08-29

## 2025-08-29 RX ORDER — OMEPRAZOLE 20 MG/1
1 CAPSULE 1/2 TO 1 HOUR BEFORE MORNING MEAL CAPSULE, DELAYED RELEASE ORAL ONCE A DAY
Qty: 90 | Refills: 0 | OUTPATIENT
Start: 2025-08-29